# Patient Record
Sex: MALE | Race: BLACK OR AFRICAN AMERICAN | NOT HISPANIC OR LATINO | URBAN - NONMETROPOLITAN AREA
[De-identification: names, ages, dates, MRNs, and addresses within clinical notes are randomized per-mention and may not be internally consistent; named-entity substitution may affect disease eponyms.]

---

## 2021-02-11 ENCOUNTER — IMPORTED ENCOUNTER (OUTPATIENT)
Dept: URBAN - NONMETROPOLITAN AREA CLINIC 1 | Facility: CLINIC | Age: 51
End: 2021-02-11

## 2021-02-11 PROBLEM — E11.9: Noted: 2021-02-11

## 2021-02-11 PROBLEM — H40.013: Noted: 2021-02-11

## 2021-02-11 PROBLEM — H52.4: Noted: 2021-02-11

## 2021-02-11 PROBLEM — H25.013: Noted: 2021-02-11

## 2021-02-11 PROCEDURE — 99204 OFFICE O/P NEW MOD 45 MIN: CPT

## 2021-02-11 PROCEDURE — 92015 DETERMINE REFRACTIVE STATE: CPT

## 2021-02-11 NOTE — PATIENT DISCUSSION
NIDDM (15 years)- Discussed diagnosis in detail with patient- Stressed importance of good blood sugar control- Recommend no soda’s- Patient reports last A1C was 9 and last blood sugar was 237- No diabetic retinopathy seen today - Notes to Dr. Merissa Norton in [id number] to monitorCataracts OS - Discussed diagnosis in detail with patient- Discussed signs and symptoms of progression- Discussed UV protection- No treatment needed at this time - Continue to monitorBorderline Glaucoma OU- Discussed diagnosis in detail with patient- Patient states that he has been on drops for Glaucoma in the past and has been told he had glaucoma and then told that he didnt have it - IOP today 16 OU- Cup to Disc noted at .8 OU - Continue to monitor- RTC 2-3 months BL GL with VF OCT Gonio and PACHY Myopia / Astigmatism / Presbyopia OU - Discussed diagnosis in detail with patient- New Glasses RX given today MR re-checked by Dr. Maritza Maddox today- Continue to monitor- RTC 1 year complete

## 2022-02-08 ENCOUNTER — EMERGENCY VISIT (OUTPATIENT)
Dept: URBAN - NONMETROPOLITAN AREA CLINIC 1 | Facility: CLINIC | Age: 52
End: 2022-02-08

## 2022-02-08 DIAGNOSIS — H57.13: ICD-10-CM

## 2022-02-08 DIAGNOSIS — H10.13: ICD-10-CM

## 2022-02-08 PROCEDURE — 99213 OFFICE O/P EST LOW 20 MIN: CPT

## 2022-02-08 RX ORDER — KETOROLAC TROMETHAMINE 5 MG/ML: 1 SOLUTION OPHTHALMIC TWICE A DAY

## 2022-02-08 ASSESSMENT — VISUAL ACUITY
OD_SC: 20/25
OS_SC: 20/25

## 2022-02-08 ASSESSMENT — TONOMETRY
OD_IOP_MMHG: 22
OS_IOP_MMHG: 20

## 2022-02-08 NOTE — PATIENT DISCUSSION
Discussed diagnosis in detail with patient. Appears stable on exam today. Continue to monitor every few months with testing as directed.

## 2022-02-08 NOTE — PATIENT DISCUSSION
Discussed diagnosis in detail with patient, start Ketorolac BID -TID , RX sent to pharmacy. Continue to monitor.

## 2022-02-08 NOTE — PATIENT DISCUSSION
Discussed diagnosis in detail with patient. Discussed signs and symptoms of progression. Discussed UV protection. No treatment needed at this time Continue to monitor.

## 2022-02-23 ENCOUNTER — EMERGENCY VISIT (OUTPATIENT)
Dept: URBAN - NONMETROPOLITAN AREA CLINIC 1 | Facility: CLINIC | Age: 52
End: 2022-02-23

## 2022-02-23 DIAGNOSIS — H40.013: ICD-10-CM

## 2022-02-23 PROCEDURE — 99213 OFFICE O/P EST LOW 20 MIN: CPT

## 2022-02-23 PROCEDURE — 92020 GONIOSCOPY: CPT

## 2022-02-23 ASSESSMENT — TONOMETRY
OD_IOP_MMHG: 24
OS_IOP_MMHG: 24
OD_IOP_MMHG: 20
OS_IOP_MMHG: 21

## 2022-02-23 ASSESSMENT — VISUAL ACUITY
OD_PH: 20/20-2
OD_SC: 20/40
OS_SC: 20/20

## 2022-02-23 NOTE — PATIENT DISCUSSION
Discussed diagnosis in detail with patient. Patient complains of pain in his eyes today. Gonio done today in office. Grade III with heavy pigment. Pressures today are elevated. Patient was to return in 3 months for BL GL testing after 2/11/21 visit but never came back for the testing. Recommend patient coming back for VF, OCT and PACHY. Will start Vyzulta QHS OU, sample given today.

## 2022-04-09 ASSESSMENT — VISUAL ACUITY
OD_CC: 20/30-
OS_CC: 20/25+2

## 2022-04-09 ASSESSMENT — TONOMETRY
OD_IOP_MMHG: 16
OS_IOP_MMHG: 16